# Patient Record
Sex: MALE | Race: WHITE | URBAN - METROPOLITAN AREA
[De-identification: names, ages, dates, MRNs, and addresses within clinical notes are randomized per-mention and may not be internally consistent; named-entity substitution may affect disease eponyms.]

---
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
## 2024-07-30 ENCOUNTER — TELEPHONE (OUTPATIENT)
Facility: CLINIC | Age: 24
End: 2024-07-30

## 2024-07-30 NOTE — TELEPHONE ENCOUNTER
----- Message from Juan Melendez Salde sent at 7/30/2024 12:00 PM EDT -----  Regarding: ECC Message to Provider  ECC Message to Provider    Relationship to Patient: Self     Additional Information: The patient is requesting about that he need a full immunization record so that he can submit it online to the [facility]  --------------------------------------------------------------------------------------------------------------------------    Call Back Information: OK to leave message on voicemail  Preferred Call Back Number: Phone: [phone]

## 2024-07-30 NOTE — TELEPHONE ENCOUNTER
Called and spoke with patient, advised that he would need to come to the office to  his immunization record, advised that if he was needing immunizations he would have to make a visit. Patient asked if he could have his mother  or have it emailed as he is out of town. Advised that because he has not been seen since 2020 and we do not have an updated HIPAA form on file we would be unable to allow his mother to  and that we do not email PHI due to wanting to protect his PHI and HIPAA